# Patient Record
Sex: FEMALE | Race: BLACK OR AFRICAN AMERICAN
[De-identification: names, ages, dates, MRNs, and addresses within clinical notes are randomized per-mention and may not be internally consistent; named-entity substitution may affect disease eponyms.]

---

## 2017-07-25 NOTE — PHYS DOC
Past Medical History


Past Medical History:  No Pertinent History


Additional Past Medical Histor:  Constipation.


Past Surgical History:  No Surgical History


Additional Past Surgical Histo:  Mirena placed.


Alcohol Use:  None


Drug Use:  None





Adult General


Chief Complaint


Chief Complaint:  DENTAL PROBLEM





HPI


HPI





Patient is a 21  year old female presents to the emergency department stating 

that she's had a 4 day history of dental pain in the left lower mouth area. 

Patient states that she has also had a headache as well. She states that she 

has been taken ibuprofen for pain and discomfort last took ibuprofen at 10:00 

this morning. Patient states that she has had no fevers no chills or nausea 

vomiting. She states she has a dental appointment at 7:00 in the morning.





Review of Systems


Review of Systems





Constitutional: Denies fever or chills []


Eyes: Denies change in visual acuity, redness, or eye pain []


HENT: Denies nasal congestion or sore throat. Complained of dental pain


Respiratory: Denies cough or shortness of breath []


Cardiovascular: No additional information not addressed in HPI []


GI: Denies abdominal pain, nausea, vomiting, bloody stools or diarrhea []


: Denies dysuria or hematuria []


Musculoskeletal: Denies back pain or joint pain []


Integument: Denies rash or skin lesions []


Neurologic:  headache, denies focal weakness or sensory changes []


Endocrine: Denies polyuria or polydipsia []





Allergies


Allergies





Allergies








Coded Allergies Type Severity Reaction Last Updated Verified


 


  iodine Allergy Intermediate BROKE OUT WITH HIVES 12/13/16 Yes


 


  amoxicillin Adverse Reaction Intermediate YEAST INFECTION 12/13/16 Yes











Physical Exam


Physical Exam





Constitutional: Well developed, well nourished, no acute distress, non-toxic 

appearance. []


HENT: Normocephalic, atraumatic, bilateral external ears normal, oropharynx 

moist, no oral exudates, nose normal. Bilateral tympanic membranes appear to be 

red. Patient appears to have a cavity noted at the #20 tooth. No redness or 

abscess noted along the gumline.


Eyes: PERRLA, EOMI, conjunctiva normal, no discharge. [] 


Neck: Normal range of motion, no tenderness, supple, no stridor. [] 


Cardiovascular:Heart rate regular rhythm, no murmur []


Lungs & Thorax:  Bilateral breath sounds clear to auscultation []


Skin: Warm, dry, no erythema, no rash. [] 


Back: No tenderness


Extremities: No tenderness, no cyanosis, no clubbing, ROM intact, no edema. [] 


Neurologic: Alert and oriented X 3, normal motor function, normal sensory 

function, no focal deficits noted. []


Psychologic: Affect normal, judgement normal, mood normal. []





Current Patient Data


Vital Signs





 Vital Signs








  Date Time  Temp Pulse Resp B/P (MAP) Pulse Ox O2 Delivery O2 Flow Rate FiO2


 


7/24/17 23:59 98.2 79 18  99 Room Air  





 98.2       











EKG


EKG


[]





Radiology/Procedures


Radiology/Procedures


[]





Course & Med Decision Making


Course & Med Decision Making


Pertinent Labs and Imaging studies reviewed. (See chart for details)


Patient is claiming amoxicillin as been an allergy although she states it 

causes her to have yeast infections. Patient will be provided with Toradol 

Benadryl and Reglan here in the emergency department. She'll be discharged home 

with recommendations to keep her appointment in the morning with the dentist. 

She'll be provided with amoxicillin and Diflucan for used to infection. Patient 

will be discharged home in stable condition since symptoms to return back to 

emergency department as been provided. Recommended ibuprofen 800 mg every 8 

hours with food stop taking few develop an upset stomach.


[]





Dragon Disclaimer


Dragon Disclaimer


This electronic medical record was generated, in whole or in part, using a 

voice recognition dictation system.





Departure


Departure


Impression:  


 Primary Impression:  


 Bilateral otitis media


 Additional Impressions:  


 Dental cavities


 Headache


Disposition:  01 HOME, SELF-CARE


Condition:  STABLE


Referrals:  


NO PCP (PCP)


Patient Instructions:  Dental Caries-Brief, General Headache Without Cause, Easy

-to-Read, Otitis Media, Adult, Easy-to-Read





Additional Instructions:  


You have been provided with medications here in the emergency department will 

cause drowsiness. Be advisable that you go home and rest.


Ibuprofen 800 mg every 8 hours with food stop taking few develop an upset 

stomach.


Antibiotics as prescribed.


You've been provided with Diflucan to help with yeast infection.


Follow-up with the dentist in which she state you have an appointment tomorrow.


Return back to emergency department for signs and symptoms of become worse.


Scripts


Fluconazole (DIFLUCAN) 150 Mg Tablet


1 TAB PO ONCE, #1 TAB 1 Refill


   Prov: ANEESHLUIS ANGEL M APRN         7/25/17 


Amoxicillin (AMOXICILLIN) 500 Mg Capsule


1 CAP PO QID, #40 CAP


   Prov: LUIS ANGEL MELENDREZ         7/25/17





Problem Qualifiers











LUIS ANGEL MELENDREZ Jul 25, 2017 00:15

## 2017-08-21 ENCOUNTER — HOSPITAL ENCOUNTER (EMERGENCY)
Dept: HOSPITAL 61 - ER | Age: 22
Discharge: HOME | End: 2017-08-21
Payer: COMMERCIAL

## 2017-08-21 VITALS — BODY MASS INDEX: 23.92 KG/M2 | WEIGHT: 130 LBS | HEIGHT: 62 IN

## 2017-08-21 VITALS — SYSTOLIC BLOOD PRESSURE: 109 MMHG | DIASTOLIC BLOOD PRESSURE: 73 MMHG

## 2017-08-21 DIAGNOSIS — L02.412: Primary | ICD-10-CM

## 2017-08-21 PROCEDURE — 99283 EMERGENCY DEPT VISIT LOW MDM: CPT

## 2017-08-21 NOTE — PHYS DOC
Past Medical History


Past Medical History:  No Pertinent History


Additional Past Medical Histor:  Constipation.


Past Surgical History:  No Surgical History


Additional Past Surgical Histo:  Mirena placed.


Alcohol Use:  None


Drug Use:  None





Adult General


Chief Complaint


Chief Complaint:  ABSCESS





HPI


HPI





Patient is a 21  year old female who presents complaining of left axilla 

abscesses for 4 years that got worse in the last 2 days. She states she has 

history of abscesses that have been surgically removed by Dr. Muñoz from her 

left breast. Patient denies any fever. Denies any drainage from the area.





Review of Systems


Review of Systems





Constitutional: Denies fever or chills []


Musculoskeletal: Denies back pain or joint pain []


Integument: left axilla abscesses


Neurologic: Denies headache, focal weakness or sensory changes []





Allergies


Allergies





Allergies








Coded Allergies Type Severity Reaction Last Updated Verified


 


  iodine Allergy Intermediate BROKE OUT WITH HIVES 12/13/16 Yes


 


  amoxicillin Adverse Reaction Intermediate YEAST INFECTION 12/13/16 Yes











Physical Exam


Physical Exam





Constitutional: Well developed, well nourished, no acute distress, non-toxic 

appearance. []


Skin: Left axilla with an indurated area approximately 2 x 0.3 cm. The area is 

not warm or erythematous, the area is TTP but not fluctuant.


Back: No tenderness, no CVA tenderness. [] 


Extremities: No tenderness, no cyanosis, no clubbing, ROM intact, no edema. [] 


Neurologic: Alert and oriented X 3, normal motor function, normal sensory 

function, no focal deficits noted. []


Psychologic: Affect normal, judgement normal, mood normal. []





Current Patient Data


Vital Signs





 Vital Signs








  Date Time  Temp Pulse Resp B/P (MAP) Pulse Ox O2 Delivery O2 Flow Rate FiO2


 


8/21/17 18:20 98.3 84 20  98 Room Air  





 98.3       











EKG


EKG


[]





Radiology/Procedures


Radiology/Procedures


[]





Course & Med Decision Making


Course & Med Decision Making


Pertinent Labs and Imaging studies reviewed. (See chart for details)





Patient is in the ED with an abscess to the left axilla that she has had for 4 

years but got worse in the last couple days. She has history of abscesses that 

typically have to be drained surgically. The current abscesses not fluctuant or 

ready to be drained. Tetanus is up to date. Recommended she follows up with her 

general surgeon by calling the office tomorrow and set up a follow-up 

appointment. Discharged Bactrim. Warm compresses recommended to the area. 

Provided return precautions and discharged in stable condition.





Dragon Disclaimer


Dragon Disclaimer


This electronic medical record was generated, in whole or in part, using a 

voice recognition dictation system.





Departure


Departure


Impression:  


 Primary Impression:  


 Abscess of axilla, left


Disposition:  01 HOME, SELF-CARE


Condition:  STABLE


Referrals:  


NO PCP (PCP)








ZITA MUÑOZ MD


You need to call the office tomorrow and f/u


Patient Instructions:  Abscess, Easy-to-Read





Additional Instructions:  


You were seen for left axilla abscess. We highly recommend you contact Dr. Muñoz tomorrow morning and get a follow-up appointment. Take the prescribed 

antibiotics as ordered. Apply warm compresses to the area. Do not shave the 

area until the infection has cleared out. Come back to the ED if symptoms worsen


Scripts


Tramadol Hcl (ULTRAM) 50 Mg Tablet


1 TAB PO Q6HRS, #20 TAB


   Prov: GO VILLA         8/21/17 


Sulfamethoxazole/Trimethoprim (BACTRIM DS TABLET) 1 Each Tablet


1 TAB PO BID, #20 TAB


   Prov: GO VILLA         8/21/17











GO VILLA Aug 21, 2017 18:32

## 2017-08-24 ENCOUNTER — HOSPITAL ENCOUNTER (OUTPATIENT)
Dept: HOSPITAL 61 - SURG | Age: 22
Discharge: HOME | End: 2017-08-24
Attending: SURGERY
Payer: COMMERCIAL

## 2017-08-24 VITALS — SYSTOLIC BLOOD PRESSURE: 132 MMHG | DIASTOLIC BLOOD PRESSURE: 72 MMHG

## 2017-08-24 DIAGNOSIS — Z72.0: ICD-10-CM

## 2017-08-24 DIAGNOSIS — Z91.041: ICD-10-CM

## 2017-08-24 DIAGNOSIS — Z88.1: ICD-10-CM

## 2017-08-24 DIAGNOSIS — L02.412: Primary | ICD-10-CM

## 2017-08-24 LAB
NEG OBC UR: (no result)
POS OBC UR: (no result)

## 2017-08-24 PROCEDURE — 87205 SMEAR GRAM STAIN: CPT

## 2017-08-24 PROCEDURE — 81025 URINE PREGNANCY TEST: CPT

## 2017-08-24 PROCEDURE — 10060 I&D ABSCESS SIMPLE/SINGLE: CPT

## 2017-08-24 PROCEDURE — S0028 INJECTION, FAMOTIDINE, 20 MG: HCPCS

## 2017-08-24 RX ADMIN — FENTANYL CITRATE PRN MCG: 50 INJECTION INTRAMUSCULAR; INTRAVENOUS at 17:30

## 2017-08-24 RX ADMIN — FENTANYL CITRATE PRN MCG: 50 INJECTION INTRAMUSCULAR; INTRAVENOUS at 17:42

## 2017-08-24 NOTE — OP
DATE OF SURGERY:  08/24/2017



PREOPERATIVE DIAGNOSIS:  Left axillary abscess.



POSTOPERATIVE DIAGNOSIS:  Left axillary abscess.



PROCEDURE:  Incision and drainage, left axillary abscess.



SURGEON:  Zita Muñoz MD.



ANESTHESIA:  General LMA.



ESTIMATED BLOOD LOSS:  Minimal.



IV FLUID:  300 mL.



INDICATIONS:  The patient is a 21-year-old with pain, warmth, redness, and

drainage from her left axilla, brought for I and D.



DESCRIPTION OF PROCEDURE:  The patient brought to the operating suite, given a

general LMA and the left axilla was prepped and draped in usual sterile fashion.

 The process was unroofed, evacuated, cultured and irrigated.  Hemostasis with

cautery.  Wound was dressed with antibiotic ointment, half-inch plain Nu Gauze

soaked in saline.  Sterile dressing applied.  The patient awakened from her

anesthetic and taken to the recovery room in satisfactory condition.

 



______________________________

ZITA MUÑOZ MD



DR:  JORGE LUIS/marco  JOB#:  [id number] / [id number]

DD:  08/24/2017 17:04  DT:  08/24/2017 17:58

## 2017-08-24 NOTE — DISCH
DISCHARGE INSTRUCTIONS


Condition on Discharge


Condition on Discharge:  Stable





Activity After Discharge


Activity Instructions for Disc:  Activity as tolerated, Avoid exertion


Lifting Instructions after Dis:  No heavy lifting


Driving Instructions after Dis:  Do not drive today





Diet after Discharge


Diet after Discharge:  Regular





Wound Incision Care


Wound/Incision Care:  Ice to area for comfort


Other wound/incision instructi:  may shower Saturday





Follow-Up


Follow Up With:  Sin's office, Monday











ZITA MUÑOZ MD Aug 24, 2017 17:05

## 2017-08-24 NOTE — PDOC
BRIEF OPERATIVE NOTE


Date:  Aug 24, 2017


Pre-Op Diagnosis


left axillary abscess


Post-Op Diagnosis


same


Procedure Performed


incision and drainage


Surgeon


Sin


Anesthesia Type:  General


Blood Loss


minimal


IV Fluid


300cc


Specimens Obtained


cultures


Findings


sebaceous abscess


Complications


none


OPerative Note


Wk # [id number]











ZITA MUÑOZ MD Aug 24, 2017 17:04

## 2018-03-07 ENCOUNTER — HOSPITAL ENCOUNTER (EMERGENCY)
Dept: HOSPITAL 61 - ER | Age: 23
Discharge: HOME | End: 2018-03-07
Payer: SELF-PAY

## 2018-03-07 DIAGNOSIS — Z88.1: ICD-10-CM

## 2018-03-07 DIAGNOSIS — N61.0: Primary | ICD-10-CM

## 2018-03-07 DIAGNOSIS — Z91.041: ICD-10-CM

## 2018-03-07 PROCEDURE — 99283 EMERGENCY DEPT VISIT LOW MDM: CPT

## 2018-03-12 ENCOUNTER — HOSPITAL ENCOUNTER (OUTPATIENT)
Dept: HOSPITAL 61 - SURG | Age: 23
Discharge: HOME | End: 2018-03-12
Attending: SURGERY
Payer: SELF-PAY

## 2018-03-12 DIAGNOSIS — Z98.890: ICD-10-CM

## 2018-03-12 DIAGNOSIS — N61.1: Primary | ICD-10-CM

## 2018-03-12 DIAGNOSIS — F17.210: ICD-10-CM

## 2018-03-12 LAB
NEG OBC UR: (no result)
POS OBC UR: (no result)
U PREG PATIENT: NEGATIVE

## 2018-03-12 PROCEDURE — 10060 I&D ABSCESS SIMPLE/SINGLE: CPT

## 2018-03-12 PROCEDURE — 87205 SMEAR GRAM STAIN: CPT

## 2018-03-12 PROCEDURE — 81025 URINE PREGNANCY TEST: CPT

## 2018-03-12 RX ADMIN — OXYCODONE HYDROCHLORIDE AND ACETAMINOPHEN 1 TAB: 5; 325 TABLET ORAL at 19:02

## 2018-03-12 RX ADMIN — PROCHLORPERAZINE EDISYLATE 1 MG: 5 INJECTION INTRAMUSCULAR; INTRAVENOUS at 17:04

## 2018-03-12 RX ADMIN — FENTANYL CITRATE 1 MCG: 50 INJECTION INTRAMUSCULAR; INTRAVENOUS at 17:03

## 2018-03-12 RX ADMIN — FENTANYL CITRATE 1 MCG: 50 INJECTION INTRAMUSCULAR; INTRAVENOUS at 18:46

## 2018-03-12 RX ADMIN — BUPIVACAINE HYDROCHLORIDE 1 ML: 5 INJECTION, SOLUTION EPIDURAL; INTRACAUDAL at 17:37
